# Patient Record
Sex: FEMALE | Race: WHITE
[De-identification: names, ages, dates, MRNs, and addresses within clinical notes are randomized per-mention and may not be internally consistent; named-entity substitution may affect disease eponyms.]

---

## 2020-01-09 ENCOUNTER — HOSPITAL ENCOUNTER (INPATIENT)
Dept: HOSPITAL 54 - TELE | Age: 80
LOS: 5 days | Discharge: HOME | DRG: 291 | End: 2020-01-14
Attending: INTERNAL MEDICINE | Admitting: NURSE PRACTITIONER
Payer: MEDICARE

## 2020-01-09 VITALS — WEIGHT: 156 LBS | BODY MASS INDEX: 30.63 KG/M2 | HEIGHT: 60 IN

## 2020-01-09 VITALS — DIASTOLIC BLOOD PRESSURE: 59 MMHG | SYSTOLIC BLOOD PRESSURE: 138 MMHG

## 2020-01-09 DIAGNOSIS — N17.0: ICD-10-CM

## 2020-01-09 DIAGNOSIS — Z88.2: ICD-10-CM

## 2020-01-09 DIAGNOSIS — E11.9: ICD-10-CM
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
## 2020-01-09 DIAGNOSIS — T86.12: ICD-10-CM

## 2020-01-09 DIAGNOSIS — Z88.0: ICD-10-CM

## 2020-01-09 DIAGNOSIS — I48.0: ICD-10-CM

## 2020-01-09 DIAGNOSIS — Z79.01: ICD-10-CM

## 2020-01-09 DIAGNOSIS — D50.9: ICD-10-CM

## 2020-01-09 DIAGNOSIS — I50.33: ICD-10-CM
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
## 2020-01-09 DIAGNOSIS — J15.9: ICD-10-CM

## 2020-01-09 DIAGNOSIS — I11.0: Primary | ICD-10-CM

## 2020-01-09 DIAGNOSIS — Z79.899: ICD-10-CM

## 2020-01-09 DIAGNOSIS — J45.901: ICD-10-CM

## 2020-01-09 DIAGNOSIS — E03.9: ICD-10-CM

## 2020-01-09 DIAGNOSIS — Z95.0: ICD-10-CM

## 2020-01-09 DIAGNOSIS — Z79.4: ICD-10-CM

## 2020-01-09 PROCEDURE — A4216 STERILE WATER/SALINE, 10 ML: HCPCS

## 2020-01-09 PROCEDURE — G0378 HOSPITAL OBSERVATION PER HR: HCPCS

## 2020-01-09 RX ADMIN — ALBUTEROL SULFATE SCH MG: 1.25 SOLUTION RESPIRATORY (INHALATION) at 22:42

## 2020-01-09 RX ADMIN — Medication SCH MG: at 22:42

## 2020-01-09 RX ADMIN — GUAIFENESIN SCH MG: 600 TABLET, EXTENDED RELEASE ORAL at 23:34

## 2020-01-09 NOTE — NUR
TELE/RN OPENING NOTES



PT DIRECT ADMIT FROM [facility]. PT IS A.OX3. PLACED ON 2L O2 VIA NC, PT WITH 
AUDIBLE WHEEZING, SOB ON EXERTION. WET COUGH NOTED. IV TO LEFT WRIST PATENT AND INTACT. 
BELONGINGS LIST COMPLETED. ORIENTED PT TO ROOM AND CALL LIGHT. PLACED ON CARDIAC MONITOR, 
SHOWING NSR, AV PACING WITH HR 60. BED IN LOW/LOCKED POSITION WITH CALL LIGHT IN REACH. SIDE 
RAILS UPX2 AND BED ALARM ON FOR SAFETY. HIMANSHU MORROW NOTIFIED OF PT'S ARRIVAL. WILL COME TO 
SEE THE PT

## 2020-01-10 VITALS — SYSTOLIC BLOOD PRESSURE: 117 MMHG | DIASTOLIC BLOOD PRESSURE: 53 MMHG

## 2020-01-10 VITALS — SYSTOLIC BLOOD PRESSURE: 140 MMHG | DIASTOLIC BLOOD PRESSURE: 66 MMHG

## 2020-01-10 VITALS — SYSTOLIC BLOOD PRESSURE: 122 MMHG | DIASTOLIC BLOOD PRESSURE: 52 MMHG

## 2020-01-10 VITALS — SYSTOLIC BLOOD PRESSURE: 115 MMHG | DIASTOLIC BLOOD PRESSURE: 60 MMHG

## 2020-01-10 VITALS — DIASTOLIC BLOOD PRESSURE: 63 MMHG | SYSTOLIC BLOOD PRESSURE: 132 MMHG

## 2020-01-10 LAB
BASOPHILS # BLD AUTO: 0 /CMM (ref 0–0.2)
BASOPHILS NFR BLD AUTO: 0 % (ref 0–2)
BUN SERPL-MCNC: 34 MG/DL (ref 7–18)
CALCIUM SERPL-MCNC: 8.7 MG/DL (ref 8.5–10.1)
CHLORIDE SERPL-SCNC: 103 MMOL/L (ref 98–107)
CHOLEST SERPL-MCNC: 91 MG/DL (ref ?–200)
CO2 SERPL-SCNC: 20 MMOL/L (ref 21–32)
CREAT SERPL-MCNC: 1.5 MG/DL (ref 0.6–1.3)
EOSINOPHIL NFR BLD AUTO: 0 % (ref 0–6)
GLUCOSE SERPL-MCNC: 193 MG/DL (ref 74–106)
HCT VFR BLD AUTO: 28 % (ref 33–45)
HDLC SERPL-MCNC: 19 MG/DL (ref 40–60)
HGB BLD-MCNC: 9 G/DL (ref 11.5–14.8)
LDLC SERPL DIRECT ASSAY-MCNC: 48 MG/DL (ref 0–99)
LYMPHOCYTES NFR BLD AUTO: 0.2 /CMM (ref 0.8–4.8)
LYMPHOCYTES NFR BLD AUTO: 5.6 % (ref 20–44)
MAGNESIUM SERPL-MCNC: 1.9 MG/DL (ref 1.8–2.4)
MCHC RBC AUTO-ENTMCNC: 32 G/DL (ref 31–36)
MCV RBC AUTO: 80 FL (ref 82–100)
MONOCYTES NFR BLD AUTO: 0.1 /CMM (ref 0.1–1.3)
MONOCYTES NFR BLD AUTO: 3.3 % (ref 2–12)
NEUTROPHILS # BLD AUTO: 3.2 /CMM (ref 1.8–8.9)
NEUTROPHILS NFR BLD AUTO: 91.1 % (ref 43–81)
PHOSPHATE SERPL-MCNC: 3.1 MG/DL (ref 2.5–4.9)
PLATELET # BLD AUTO: 259 /CMM (ref 150–450)
POTASSIUM SERPL-SCNC: 3.7 MMOL/L (ref 3.5–5.1)
RBC # BLD AUTO: 3.45 MIL/UL (ref 4–5.2)
SODIUM SERPL-SCNC: 137 MMOL/L (ref 136–145)
TRIGL SERPL-MCNC: 164 MG/DL (ref 30–150)
WBC NRBC COR # BLD AUTO: 3.6 K/UL (ref 4.3–11)

## 2020-01-10 RX ADMIN — Medication SCH EACH: at 00:37

## 2020-01-10 RX ADMIN — MYCOPHENOLATE MOFETIL SCH MG: 250 CAPSULE ORAL at 16:58

## 2020-01-10 RX ADMIN — INSULIN HUMAN PRN UNIT: 100 INJECTION, SOLUTION PARENTERAL at 17:19

## 2020-01-10 RX ADMIN — FLUTICASONE FUROATE AND VILANTEROL TRIFENATATE SCH EACH: 100; 25 POWDER RESPIRATORY (INHALATION) at 13:54

## 2020-01-10 RX ADMIN — Medication SCH MG: at 07:48

## 2020-01-10 RX ADMIN — Medication SCH MG: at 00:45

## 2020-01-10 RX ADMIN — Medication SCH EACH: at 17:34

## 2020-01-10 RX ADMIN — ALBUTEROL SULFATE SCH MG: 1.25 SOLUTION RESPIRATORY (INHALATION) at 07:48

## 2020-01-10 RX ADMIN — Medication SCH EACH: at 21:17

## 2020-01-10 RX ADMIN — OSELTAMIVIR PHOSPHATE SCH MG: 75 CAPSULE ORAL at 08:40

## 2020-01-10 RX ADMIN — ALBUTEROL SULFATE SCH MG: 1.25 SOLUTION RESPIRATORY (INHALATION) at 00:45

## 2020-01-10 RX ADMIN — Medication SCH MG: at 13:29

## 2020-01-10 RX ADMIN — GUAIFENESIN SCH MG: 600 TABLET, EXTENDED RELEASE ORAL at 08:40

## 2020-01-10 RX ADMIN — ALBUTEROL SULFATE SCH MG: 1.25 SOLUTION RESPIRATORY (INHALATION) at 13:29

## 2020-01-10 RX ADMIN — AMIODARONE HYDROCHLORIDE SCH MG: 200 TABLET ORAL at 14:08

## 2020-01-10 RX ADMIN — DOXYCYCLINE HYCLATE SCH MG: 100 TABLET, COATED ORAL at 21:08

## 2020-01-10 RX ADMIN — Medication SCH MG: at 16:58

## 2020-01-10 RX ADMIN — GUAIFENESIN SCH MG: 600 TABLET, EXTENDED RELEASE ORAL at 21:08

## 2020-01-10 RX ADMIN — Medication PRN EACH: at 21:29

## 2020-01-10 RX ADMIN — OSELTAMIVIR PHOSPHATE SCH MG: 75 CAPSULE ORAL at 16:58

## 2020-01-10 RX ADMIN — CEFEPIME HYDROCHLORIDE SCH MLS/HR: 1 INJECTION, POWDER, FOR SOLUTION INTRAMUSCULAR; INTRAVENOUS at 21:29

## 2020-01-10 RX ADMIN — ALBUTEROL SULFATE SCH MG: 1.25 SOLUTION RESPIRATORY (INHALATION) at 20:18

## 2020-01-10 RX ADMIN — Medication SCH MG: at 20:18

## 2020-01-10 RX ADMIN — CARVEDILOL SCH MG: 3.12 TABLET, FILM COATED ORAL at 21:08

## 2020-01-10 RX ADMIN — Medication SCH EACH: at 06:33

## 2020-01-10 RX ADMIN — INSULIN HUMAN PRN UNIT: 100 INJECTION, SOLUTION PARENTERAL at 00:38

## 2020-01-10 RX ADMIN — ATORVASTATIN CALCIUM SCH MG: 10 TABLET, FILM COATED ORAL at 21:08

## 2020-01-10 RX ADMIN — FLUTICASONE PROPIONATE SCH GM: 50 SPRAY, METERED NASAL at 21:28

## 2020-01-10 RX ADMIN — INSULIN HUMAN PRN UNIT: 100 INJECTION, SOLUTION PARENTERAL at 21:19

## 2020-01-10 RX ADMIN — INSULIN HUMAN PRN UNIT: 100 INJECTION, SOLUTION PARENTERAL at 06:33

## 2020-01-10 RX ADMIN — RIVAROXABAN SCH MG: 15 TABLET, FILM COATED ORAL at 16:59

## 2020-01-10 RX ADMIN — FLUTICASONE FUROATE AND VILANTEROL TRIFENATATE SCH EACH: 100; 25 POWDER RESPIRATORY (INHALATION) at 13:39

## 2020-01-10 RX ADMIN — Medication SCH EACH: at 12:00

## 2020-01-10 NOTE — NUR
MS RN OPENING NOTES

PATIENT AWAKE AND RESTING IN BED. A/O X4. ON 3L NC. NO S/S OF ACUTE RESPIRATORY DISTRESS AND 
NO COMPLAINTS OF PAIN AT THIS TIME. PATIENT REPORTS SOB DURING EXERTION. IV PRESENT ON LEFT 
WRIST, SIZE 22, INTACT & PATENT, HEP LOCKED. BED LOCKED, SEMI-CHENG'S POSITION, SIDE RAILS 
X2, CALL LIGHT WITHIN REACH. WILL CONTINUE TO MONITOR.

## 2020-01-10 NOTE — NUR
Met with patient, she is alert and pleasant. States she lives with her daughter in a single 
level home in [place]. States she is ambulatory and sometimes uses a walker if needed. 
Available DME: walker, shower chair and glucometer, no homehealth reported. She has hx of 
ESRD s/p kidney transplant in 2001, her son was the kidney donor. Pcp is  at [place]. Patient plan to return home, she will make arrangement for her family to provide 
transportation. 










-------------------------------------------------------------------------------

Addendum: 01/10/20 at 2151 by DAV SULLIVAN RN

-------------------------------------------------------------------------------

Amended: Links added.

## 2020-01-10 NOTE — NUR
TELE/RN OPENING NOTE

RECEIVED PATIENT AWAKE LYING IN BED COMFORTABLY ALERT AND ORIENTED X4. REMAINS ON 2LPM O2 
VIA NC, STILL WITH SOB ON EXERTION, AUDIBLE WHEEZING, IV TO LFA PATENT AND INTACT. HOB 
ELEVATED. BED IN LOW/LOCKED POSITION WITH CALL LIGHT IN REACH. SIDE RAILS UPX2. AV PACING AT 
92.WILL CONTINUE TO MONITOR.

## 2020-01-10 NOTE — NUR
MS/RN NOTES

SOLU MEDROL 40MG NOT GIVEN AT 1500 DUE TO FREQUENCY CHANGE TO BID. SOLU MEDROL 40MG WAS 
ADMINISTERED AT 1300.

## 2020-01-10 NOTE — NUR
TELE/RN CLOSING NOTES



PT AWAKE, SITTING AT THE EDGE OF BED. REMAINS ON 2LPM O2 VIA NC, STILL WITH SOB ON EXERTION, 
AUDIBLE WHEEZING, RECEIVING SCHEDULED BREATHING TX AS ORDERED. IV TO LFA PATENT AND INTACT. 
HOB ELEVATED. BED IN LOW/LOCKED POSITION WITH CALL LIGHT IN REACH. SIDE RAILS UPX2. REMAINS 
AV PACING WITH HR IN THE 60'S. NO OTHER CHANGES OVERNIGHT. ALL NEEDS MET AND ANTICIPATED. 
ENDORSED TO DAY SHIFT RN TIFFANIE.

## 2020-01-10 NOTE — NUR
TELE/RN NOTES



NOTIFIED HIMANSHU MORROW THAT PT'S BGL= 216, PT IS DIABETIC AND ON PREDNISONE. WITH ORDERS FOR 
MILD SLIDING SCALE ACHS. ORDERS READBACK FOR PROTOCOL. PER ODALYS, GIVE COVERAGE PER 
SLIDING SCALE NOW

## 2020-01-10 NOTE — NUR
MS RN NOTES

CALLED [facility] REGARDING PATIENT'S SPUTUM AND LAB RESULTS. PER NURSE 
SUPERVISOR, WILL CALL BACK TO FOLLOW UP WITH REQUEST.

## 2020-01-10 NOTE — NUR
TELE/RN NOTES



CLARIFIED TAMIFLU ORDER WITH HIMANSHU MORROW. WITH ORDERS TO GIVE ONE DOSE NOW AND RESTART THE 
SCHEDULE 1/10 BID 0900 AND 2100. ALSO WITH VERBAL ORDERS TO ADD ROBITUSSIN AC 5ML PRN FOR 
COUGH.

-------------------------------------------------------------------------------

Addendum: 01/10/20 at 0229 by AILIN INFANTE RN

-------------------------------------------------------------------------------

PER LINH MORROW TAMIFLU ONE TIME ORDER TONIGHT. IN HOUSE PHARMACY TO REVIEW IN THE MORNING.

## 2020-01-10 NOTE — NUR
307  MS RN CLOSING NOTES



PT IN BED AWAKE AND WATCHING TV AT THIS TIME. HOB ELEVATED.  A/O X4. ABLE TO MAKE NEEDS 
KNOWN. MAINTAINED ON SUPPLEMENTAL O2 VIA N/C AT 2LPM, TOLARATING WELL BUT STILL WITH SOB ON 
EXERTION. IV ACCESS ON LFA G#22 PATENT, INTACT AND FLUSHES WELL. BED IN LOW/LOCKED POSITION 
WITH CALL LIGHT IN REACH. SIDE RAILS UP X2.  ALL NEEDS AND CARE ATTENDED WELL. WILL ENDORSE 
TO NIGHT SHIFT NURSE FOR TIFFANIE

## 2020-01-11 VITALS — SYSTOLIC BLOOD PRESSURE: 128 MMHG | DIASTOLIC BLOOD PRESSURE: 58 MMHG

## 2020-01-11 VITALS — DIASTOLIC BLOOD PRESSURE: 54 MMHG | SYSTOLIC BLOOD PRESSURE: 115 MMHG

## 2020-01-11 VITALS — DIASTOLIC BLOOD PRESSURE: 48 MMHG | SYSTOLIC BLOOD PRESSURE: 122 MMHG

## 2020-01-11 LAB
ALBUMIN SERPL BCP-MCNC: 3.1 G/DL (ref 3.4–5)
ALP SERPL-CCNC: 80 U/L (ref 46–116)
ALT SERPL W P-5'-P-CCNC: 130 U/L (ref 12–78)
APPEARANCE UR: CLEAR
APPEARANCE UR: CLEAR
AST SERPL W P-5'-P-CCNC: 74 U/L (ref 15–37)
BASE EXCESS BLDA CALC-SCNC: -5.2 MMOL/L
BASOPHILS # BLD AUTO: 0 /CMM (ref 0–0.2)
BASOPHILS NFR BLD AUTO: 0.1 % (ref 0–2)
BILIRUB SERPL-MCNC: 0.7 MG/DL (ref 0.2–1)
BILIRUB UR QL STRIP: NEGATIVE
BILIRUB UR QL STRIP: NEGATIVE
BUN SERPL-MCNC: 36 MG/DL (ref 7–18)
CALCIUM SERPL-MCNC: 8.8 MG/DL (ref 8.5–10.1)
CHLORIDE SERPL-SCNC: 102 MMOL/L (ref 98–107)
CK SERPL-CCNC: 136 U/L (ref 26–192)
CO2 SERPL-SCNC: 21 MMOL/L (ref 21–32)
COLOR UR: YELLOW
COLOR UR: YELLOW
CREAT SERPL-MCNC: 1.3 MG/DL (ref 0.6–1.3)
CREAT UR-MCNC: 75.4 MG/DL (ref 30–125)
DO-HGB MFR BLDA: 115.3 MMHG
EOSINOPHIL NFR BLD AUTO: 0 % (ref 0–6)
GLUCOSE SERPL-MCNC: 265 MG/DL (ref 74–106)
GLUCOSE UR STRIP-MCNC: NEGATIVE MG/DL
GLUCOSE UR STRIP-MCNC: NEGATIVE MG/DL
HCT VFR BLD AUTO: 29 % (ref 33–45)
HGB BLD-MCNC: 9.6 G/DL (ref 11.5–14.8)
HGB UR QL STRIP: (no result) ERY/UL
HGB UR QL STRIP: NEGATIVE ERY/UL
INHALED O2 CONCENTRATION: 32 %
INHALED O2 FLOW RATE: 3 L/MIN (ref 0–30)
KETONES UR STRIP-MCNC: NEGATIVE MG/DL
KETONES UR STRIP-MCNC: NEGATIVE MG/DL
LEUKOCYTE ESTERASE UR QL STRIP: NEGATIVE
LEUKOCYTE ESTERASE UR QL STRIP: NEGATIVE
LYMPHOCYTES NFR BLD AUTO: 0.2 /CMM (ref 0.8–4.8)
LYMPHOCYTES NFR BLD AUTO: 3 % (ref 20–44)
MAGNESIUM SERPL-MCNC: 1.9 MG/DL (ref 1.8–2.4)
MCHC RBC AUTO-ENTMCNC: 33 G/DL (ref 31–36)
MCV RBC AUTO: 81 FL (ref 82–100)
MONOCYTES NFR BLD AUTO: 0.3 /CMM (ref 0.1–1.3)
MONOCYTES NFR BLD AUTO: 4.4 % (ref 2–12)
NEUTROPHILS # BLD AUTO: 6.6 /CMM (ref 1.8–8.9)
NEUTROPHILS NFR BLD AUTO: 92.5 % (ref 43–81)
NITRITE UR QL STRIP: NEGATIVE
NITRITE UR QL STRIP: NEGATIVE
PCO2 TEMP ADJ BLDA: 30.7 MMHG (ref 35–45)
PH TEMP ADJ BLDA: 7.4 [PH] (ref 7.35–7.45)
PH UR STRIP: 5.5 [PH] (ref 5–8)
PH UR STRIP: 6 [PH] (ref 5–8)
PHOSPHATE SERPL-MCNC: 2.9 MG/DL (ref 2.5–4.9)
PLATELET # BLD AUTO: 320 /CMM (ref 150–450)
PO2 TEMP ADJ BLDA: 76.9 MMHG (ref 75–100)
POTASSIUM SERPL-SCNC: 4.2 MMOL/L (ref 3.5–5.1)
PROT SERPL-MCNC: 5.9 G/DL (ref 6.4–8.2)
PROT UR QL STRIP: 30 MG/DL
PROT UR QL STRIP: 30 MG/DL
PROT UR-MCNC: 79.4 MG/DL (ref 0–11.9)
RBC # BLD AUTO: 3.59 MIL/UL (ref 4–5.2)
RBC #/AREA URNS HPF: (no result) /HPF (ref 0–2)
RBC #/AREA URNS HPF: (no result) /HPF (ref 0–2)
SAO2 % BLDA: 94.3 % (ref 92–98.5)
SODIUM SERPL-SCNC: 136 MMOL/L (ref 136–145)
SODIUM UR-SCNC: 10 MMOL/L (ref 40–220)
UROBILINOGEN UR STRIP-MCNC: 0.2 EU/DL
UROBILINOGEN UR STRIP-MCNC: 0.2 EU/DL
WBC #/AREA URNS HPF: (no result) /HPF (ref 0–3)
WBC #/AREA URNS HPF: (no result) /HPF (ref 0–3)
WBC NRBC COR # BLD AUTO: 7.1 K/UL (ref 4.3–11)

## 2020-01-11 RX ADMIN — HUMAN INSULIN SCH UNIT: 100 INJECTION, SUSPENSION SUBCUTANEOUS at 18:30

## 2020-01-11 RX ADMIN — ALBUTEROL SULFATE SCH MG: 1.25 SOLUTION RESPIRATORY (INHALATION) at 01:44

## 2020-01-11 RX ADMIN — Medication SCH MG: at 08:21

## 2020-01-11 RX ADMIN — GUAIFENESIN SCH MG: 600 TABLET, EXTENDED RELEASE ORAL at 10:09

## 2020-01-11 RX ADMIN — Medication SCH EACH: at 21:42

## 2020-01-11 RX ADMIN — Medication SCH MG: at 10:04

## 2020-01-11 RX ADMIN — CARVEDILOL SCH MG: 3.12 TABLET, FILM COATED ORAL at 10:04

## 2020-01-11 RX ADMIN — CEFEPIME HYDROCHLORIDE SCH MLS/HR: 1 INJECTION, POWDER, FOR SOLUTION INTRAMUSCULAR; INTRAVENOUS at 21:02

## 2020-01-11 RX ADMIN — Medication SCH MG: at 01:44

## 2020-01-11 RX ADMIN — Medication PRN EACH: at 10:23

## 2020-01-11 RX ADMIN — DOXYCYCLINE HYCLATE SCH MG: 100 TABLET, COATED ORAL at 21:03

## 2020-01-11 RX ADMIN — DOXYCYCLINE HYCLATE SCH MG: 100 TABLET, COATED ORAL at 10:04

## 2020-01-11 RX ADMIN — ACETAMINOPHEN PRN MG: 325 TABLET ORAL at 21:50

## 2020-01-11 RX ADMIN — MYCOPHENOLATE MOFETIL SCH MG: 250 CAPSULE ORAL at 18:32

## 2020-01-11 RX ADMIN — INSULIN HUMAN PRN UNIT: 100 INJECTION, SOLUTION PARENTERAL at 16:44

## 2020-01-11 RX ADMIN — MYCOPHENOLATE MOFETIL SCH MG: 250 CAPSULE ORAL at 10:05

## 2020-01-11 RX ADMIN — AMIODARONE HYDROCHLORIDE SCH MG: 200 TABLET ORAL at 18:32

## 2020-01-11 RX ADMIN — Medication SCH EACH: at 06:53

## 2020-01-11 RX ADMIN — Medication SCH MG: at 10:05

## 2020-01-11 RX ADMIN — LEVOTHYROXINE SODIUM SCH MCG: 100 TABLET ORAL at 10:09

## 2020-01-11 RX ADMIN — CARVEDILOL SCH MG: 3.12 TABLET, FILM COATED ORAL at 21:04

## 2020-01-11 RX ADMIN — Medication SCH EACH: at 12:27

## 2020-01-11 RX ADMIN — Medication SCH MG: at 12:58

## 2020-01-11 RX ADMIN — Medication SCH EACH: at 18:04

## 2020-01-11 RX ADMIN — ATORVASTATIN CALCIUM SCH MG: 10 TABLET, FILM COATED ORAL at 21:02

## 2020-01-11 RX ADMIN — INSULIN HUMAN PRN UNIT: 100 INJECTION, SOLUTION PARENTERAL at 21:46

## 2020-01-11 RX ADMIN — ALBUTEROL SULFATE SCH MG: 1.25 SOLUTION RESPIRATORY (INHALATION) at 19:25

## 2020-01-11 RX ADMIN — INSULIN HUMAN PRN UNIT: 100 INJECTION, SOLUTION PARENTERAL at 06:56

## 2020-01-11 RX ADMIN — FUROSEMIDE SCH MG: 40 TABLET ORAL at 10:04

## 2020-01-11 RX ADMIN — Medication SCH MG: at 19:26

## 2020-01-11 RX ADMIN — Medication SCH MG: at 18:31

## 2020-01-11 RX ADMIN — GUAIFENESIN SCH MG: 600 TABLET, EXTENDED RELEASE ORAL at 21:03

## 2020-01-11 RX ADMIN — OSELTAMIVIR PHOSPHATE SCH MG: 75 CAPSULE ORAL at 10:05

## 2020-01-11 RX ADMIN — RIVAROXABAN SCH MG: 15 TABLET, FILM COATED ORAL at 18:31

## 2020-01-11 RX ADMIN — FLUTICASONE FUROATE AND VILANTEROL TRIFENATATE SCH EACH: 100; 25 POWDER RESPIRATORY (INHALATION) at 10:05

## 2020-01-11 RX ADMIN — ALBUTEROL SULFATE SCH MG: 1.25 SOLUTION RESPIRATORY (INHALATION) at 08:21

## 2020-01-11 RX ADMIN — FLUTICASONE PROPIONATE SCH GM: 50 SPRAY, METERED NASAL at 10:06

## 2020-01-11 RX ADMIN — FLUTICASONE PROPIONATE SCH GM: 50 SPRAY, METERED NASAL at 21:02

## 2020-01-11 RX ADMIN — INSULIN HUMAN PRN UNIT: 100 INJECTION, SOLUTION PARENTERAL at 13:22

## 2020-01-11 RX ADMIN — ALBUTEROL SULFATE SCH MG: 1.25 SOLUTION RESPIRATORY (INHALATION) at 12:58

## 2020-01-11 RX ADMIN — HUMAN INSULIN SCH UNIT: 100 INJECTION, SUSPENSION SUBCUTANEOUS at 17:00

## 2020-01-11 NOTE — NUR
MS RN OPENING NOTES

PATIENT AWAKE IN BED UPON ARRIVAL. A/O X4. ON 3L NC. NO S/S OF ACUTE RESPIRATORY DISTRESS. 
PATIENT DENIES SOB OR PAIN AT THIS TIME. IV ON LEFT WRIST, SIZE 22, INTACT & PATENT, HEP 
LOCKED. BED LOCKED, SEMI-CHENG'S POSITION, SIDE RAILS X2, CALL LIGHT WITHIN REACH. WILL 
CONTINUE TO MONITOR.

## 2020-01-11 NOTE — NUR
MS RN NOTES

SPUTUM CULTURE AND LAB RESULTS FAXED FROM [facility] AND PLACED IN CHART.

## 2020-01-11 NOTE — NUR
family and pt. upset about med regimen.especilly insulin coverage.maciej texted and meds 
adjusted according to [facility]gbaic.

## 2020-01-11 NOTE — NUR
MS RN CLOSING NOTES

PATIENT AWAKE AND RESTING IN BED. A/O X4. ON 3L NC. NO S/S OF ACUTE RESPIRATORY DISTRESS AND 
NO COMPLAINTS OF PAIN AT THIS TIME. IV PRESENT ON LEFT WRIST, SIZE 22, INTACT & PATENT, HEP 
LOCKED. BED LOCKED, SEMI-CHENG'S POSITION, SIDE RAILS X2, CALL LIGHT WITHIN REACH. WILL 
ENDORSE TO DAY SHIFT NURSE TO FOLLOW PLAN OF CARE.

## 2020-01-12 VITALS — DIASTOLIC BLOOD PRESSURE: 45 MMHG | SYSTOLIC BLOOD PRESSURE: 111 MMHG

## 2020-01-12 VITALS — SYSTOLIC BLOOD PRESSURE: 114 MMHG | DIASTOLIC BLOOD PRESSURE: 57 MMHG

## 2020-01-12 VITALS — SYSTOLIC BLOOD PRESSURE: 123 MMHG | DIASTOLIC BLOOD PRESSURE: 63 MMHG

## 2020-01-12 VITALS — SYSTOLIC BLOOD PRESSURE: 134 MMHG | DIASTOLIC BLOOD PRESSURE: 64 MMHG

## 2020-01-12 LAB
BUN SERPL-MCNC: 41 MG/DL (ref 7–18)
CALCIUM SERPL-MCNC: 8.8 MG/DL (ref 8.5–10.1)
CHLORIDE SERPL-SCNC: 104 MMOL/L (ref 98–107)
CO2 SERPL-SCNC: 24 MMOL/L (ref 21–32)
CREAT SERPL-MCNC: 1.3 MG/DL (ref 0.6–1.3)
GLUCOSE SERPL-MCNC: 147 MG/DL (ref 74–106)
POTASSIUM SERPL-SCNC: 4.2 MMOL/L (ref 3.5–5.1)
SODIUM SERPL-SCNC: 138 MMOL/L (ref 136–145)

## 2020-01-12 RX ADMIN — Medication SCH EACH: at 12:09

## 2020-01-12 RX ADMIN — Medication SCH EACH: at 17:56

## 2020-01-12 RX ADMIN — INSULIN GLARGINE SCH UNIT: 100 INJECTION, SOLUTION SUBCUTANEOUS at 08:21

## 2020-01-12 RX ADMIN — FLUTICASONE PROPIONATE SCH GM: 50 SPRAY, METERED NASAL at 21:33

## 2020-01-12 RX ADMIN — CARVEDILOL SCH MG: 3.12 TABLET, FILM COATED ORAL at 08:14

## 2020-01-12 RX ADMIN — Medication SCH EACH: at 06:25

## 2020-01-12 RX ADMIN — CARVEDILOL SCH MG: 3.12 TABLET, FILM COATED ORAL at 21:34

## 2020-01-12 RX ADMIN — ALBUTEROL SULFATE SCH MG: 1.25 SOLUTION RESPIRATORY (INHALATION) at 14:19

## 2020-01-12 RX ADMIN — MYCOPHENOLATE MOFETIL SCH MG: 250 CAPSULE ORAL at 08:12

## 2020-01-12 RX ADMIN — ALBUTEROL SULFATE SCH MG: 1.25 SOLUTION RESPIRATORY (INHALATION) at 08:16

## 2020-01-12 RX ADMIN — INSULIN HUMAN PRN UNIT: 100 INJECTION, SOLUTION PARENTERAL at 06:46

## 2020-01-12 RX ADMIN — Medication SCH MG: at 08:16

## 2020-01-12 RX ADMIN — DOXYCYCLINE HYCLATE SCH MG: 100 TABLET, COATED ORAL at 21:33

## 2020-01-12 RX ADMIN — RIVAROXABAN SCH MG: 15 TABLET, FILM COATED ORAL at 16:46

## 2020-01-12 RX ADMIN — CEFEPIME HYDROCHLORIDE SCH MLS/HR: 1 INJECTION, POWDER, FOR SOLUTION INTRAMUSCULAR; INTRAVENOUS at 21:33

## 2020-01-12 RX ADMIN — INSULIN HUMAN PRN UNIT: 100 INJECTION, SOLUTION PARENTERAL at 21:58

## 2020-01-12 RX ADMIN — FUROSEMIDE SCH MG: 40 TABLET ORAL at 08:14

## 2020-01-12 RX ADMIN — Medication SCH MG: at 08:12

## 2020-01-12 RX ADMIN — Medication SCH MG: at 14:19

## 2020-01-12 RX ADMIN — Medication SCH MG: at 17:00

## 2020-01-12 RX ADMIN — GUAIFENESIN SCH MG: 600 TABLET, EXTENDED RELEASE ORAL at 10:44

## 2020-01-12 RX ADMIN — Medication SCH MG: at 19:55

## 2020-01-12 RX ADMIN — GUAIFENESIN SCH MG: 600 TABLET, EXTENDED RELEASE ORAL at 21:33

## 2020-01-12 RX ADMIN — FLUTICASONE PROPIONATE SCH GM: 50 SPRAY, METERED NASAL at 08:24

## 2020-01-12 RX ADMIN — INSULIN HUMAN PRN UNIT: 100 INJECTION, SOLUTION PARENTERAL at 16:34

## 2020-01-12 RX ADMIN — ATORVASTATIN CALCIUM SCH MG: 10 TABLET, FILM COATED ORAL at 21:34

## 2020-01-12 RX ADMIN — ACETAMINOPHEN PRN MG: 325 TABLET ORAL at 21:33

## 2020-01-12 RX ADMIN — MYCOPHENOLATE MOFETIL SCH MG: 250 CAPSULE ORAL at 16:46

## 2020-01-12 RX ADMIN — LEVOTHYROXINE SODIUM SCH MCG: 100 TABLET ORAL at 08:12

## 2020-01-12 RX ADMIN — HUMAN INSULIN SCH UNIT: 100 INJECTION, SUSPENSION SUBCUTANEOUS at 17:04

## 2020-01-12 RX ADMIN — ALBUTEROL SULFATE SCH MG: 1.25 SOLUTION RESPIRATORY (INHALATION) at 01:11

## 2020-01-12 RX ADMIN — AMIODARONE HYDROCHLORIDE SCH MG: 200 TABLET ORAL at 08:13

## 2020-01-12 RX ADMIN — ALBUTEROL SULFATE SCH MG: 1.25 SOLUTION RESPIRATORY (INHALATION) at 19:56

## 2020-01-12 RX ADMIN — OSELTAMIVIR PHOSPHATE SCH MG: 75 CAPSULE ORAL at 08:14

## 2020-01-12 RX ADMIN — Medication SCH MG: at 08:13

## 2020-01-12 RX ADMIN — HUMAN INSULIN SCH UNIT: 100 INJECTION, SUSPENSION SUBCUTANEOUS at 08:21

## 2020-01-12 RX ADMIN — DOXYCYCLINE HYCLATE SCH MG: 100 TABLET, COATED ORAL at 08:12

## 2020-01-12 RX ADMIN — Medication SCH MG: at 01:11

## 2020-01-12 RX ADMIN — Medication SCH EACH: at 21:53

## 2020-01-12 NOTE — NUR
MS/RN Closing note 



Patient is resting in bed, A/O x4, showing no signs of acute distress or SOB, saturating 
>90% on 3L NC. IV line is clean and intact s/l, showing no signs of acute distress or SOB. 
Patient has no new concerns or complaints of pain at this time. Patient kept clean and dry 
throughout shift, all patient needs met, all due meds given. 

Bed is in lowest position, side rails x2 in upright position, call light is within reach and 
patient is aware of how to call for assistance when needed. Safety precautions enforced. 
Will endorse to night shift.

## 2020-01-12 NOTE — NUR
MS RN CLOSING NOTES

PATIENT SLEEPING IN BED, EASY TO AWAKEN. A/OX4. ON 3L NC. NO COMPLAINTS OF SOB OR PAIN AT 
THIS TIME. IV PRESENT ON LEFT WRIST, SIZE 22, INTACT & PATENT, HEP LOCKED. BED LOCKED, 
LOW-CHENG'S POSITION, SIDE RAILS X2, CALL LIGHT WITHIN REACH. WILL ENDORSE TO DAY SHIFT 
NURSE TO FOLLOW PLAN OF CARE.

## 2020-01-12 NOTE — NUR
MS/RN Opening note 



Patient received resting in bed, A/O x4, showing no signs of acute distress or SOB, 
saturating >90% on 3L NC. IV line is clean and intact s/l, showing no signs of acute 
distress or SOB. Patient has no new concerns or complaints of pain at this time. Patient is 
able to ambulate to the bathroom independently. 

Bed is in lowest position, side rails x2 in upright position, call light is within reach and 
patient is aware of how to call for assistance when needed. Will continue with plan of care.

## 2020-01-12 NOTE — NUR
MS RN OPENING NOTES

PATIENT AWAKE AND RESTING IN BED UPON ARRIVAL. A/O X4. ON 3L NC. NO COMPLAINTS OF SOB OR 
PAIN AT THIS TIME. IV PRESENT ON LEFT WRIST, SIZE 22, INTACT & PATENT, HEP LOCKED. PATIENT 
ABLE TO VERBALIZE NEEDS. BED LOCKED, SEMI-CHENG'S POSITION, SIDE RAILS X2, CALL LIGHT 
WITHIN REACH. WILL CONTINUE TO MONITOR.

## 2020-01-13 VITALS — DIASTOLIC BLOOD PRESSURE: 53 MMHG | SYSTOLIC BLOOD PRESSURE: 123 MMHG

## 2020-01-13 VITALS — DIASTOLIC BLOOD PRESSURE: 68 MMHG | SYSTOLIC BLOOD PRESSURE: 132 MMHG

## 2020-01-13 VITALS — DIASTOLIC BLOOD PRESSURE: 65 MMHG | SYSTOLIC BLOOD PRESSURE: 140 MMHG

## 2020-01-13 LAB
BASOPHILS # BLD AUTO: 0 /CMM (ref 0–0.2)
BASOPHILS NFR BLD AUTO: 0.1 % (ref 0–2)
BUN SERPL-MCNC: 40 MG/DL (ref 7–18)
CALCIUM SERPL-MCNC: 8.4 MG/DL (ref 8.5–10.1)
CHLORIDE SERPL-SCNC: 104 MMOL/L (ref 98–107)
CO2 SERPL-SCNC: 27 MMOL/L (ref 21–32)
CREAT SERPL-MCNC: 1.2 MG/DL (ref 0.6–1.3)
EOSINOPHIL NFR BLD AUTO: 0.1 % (ref 0–6)
GLUCOSE SERPL-MCNC: 116 MG/DL (ref 74–106)
HCT VFR BLD AUTO: 32 % (ref 33–45)
HGB BLD-MCNC: 10.4 G/DL (ref 11.5–14.8)
LYMPHOCYTES NFR BLD AUTO: 0.8 /CMM (ref 0.8–4.8)
LYMPHOCYTES NFR BLD AUTO: 13.1 % (ref 20–44)
LYMPHOCYTES NFR BLD MANUAL: 22 % (ref 16–48)
MCHC RBC AUTO-ENTMCNC: 32 G/DL (ref 31–36)
MCV RBC AUTO: 81 FL (ref 82–100)
MONOCYTES NFR BLD AUTO: 0.6 /CMM (ref 0.1–1.3)
MONOCYTES NFR BLD AUTO: 10.9 % (ref 2–12)
MONOCYTES NFR BLD MANUAL: 5 % (ref 0–11)
NEUTROPHILS # BLD AUTO: 4.4 /CMM (ref 1.8–8.9)
NEUTROPHILS NFR BLD AUTO: 75.8 % (ref 43–81)
NEUTS SEG NFR BLD MANUAL: 73 % (ref 42–76)
PLATELET # BLD AUTO: 317 /CMM (ref 150–450)
POTASSIUM SERPL-SCNC: 3.3 MMOL/L (ref 3.5–5.1)
RBC # BLD AUTO: 3.99 MIL/UL (ref 4–5.2)
SODIUM SERPL-SCNC: 139 MMOL/L (ref 136–145)
WBC NRBC COR # BLD AUTO: 5.8 K/UL (ref 4.3–11)

## 2020-01-13 RX ADMIN — MYCOPHENOLATE MOFETIL SCH MG: 250 CAPSULE ORAL at 08:18

## 2020-01-13 RX ADMIN — FLUTICASONE PROPIONATE SCH SPRAY: 50 SPRAY, METERED NASAL at 21:05

## 2020-01-13 RX ADMIN — CEFEPIME HYDROCHLORIDE SCH MLS/HR: 1 INJECTION, POWDER, FOR SOLUTION INTRAMUSCULAR; INTRAVENOUS at 21:05

## 2020-01-13 RX ADMIN — FLUTICASONE FUROATE AND VILANTEROL TRIFENATATE SCH EACH: 100; 25 POWDER RESPIRATORY (INHALATION) at 08:28

## 2020-01-13 RX ADMIN — Medication SCH EACH: at 08:17

## 2020-01-13 RX ADMIN — INSULIN HUMAN PRN UNIT: 100 INJECTION, SOLUTION PARENTERAL at 21:21

## 2020-01-13 RX ADMIN — DOXYCYCLINE HYCLATE SCH MG: 100 TABLET, COATED ORAL at 21:05

## 2020-01-13 RX ADMIN — Medication SCH EACH: at 21:20

## 2020-01-13 RX ADMIN — Medication SCH MG: at 08:22

## 2020-01-13 RX ADMIN — LEVOTHYROXINE SODIUM SCH MCG: 100 TABLET ORAL at 08:22

## 2020-01-13 RX ADMIN — MYCOPHENOLATE MOFETIL SCH MG: 250 CAPSULE ORAL at 16:58

## 2020-01-13 RX ADMIN — HUMAN INSULIN SCH UNIT: 100 INJECTION, SUSPENSION SUBCUTANEOUS at 08:31

## 2020-01-13 RX ADMIN — HUMAN INSULIN SCH UNIT: 100 INJECTION, SUSPENSION SUBCUTANEOUS at 16:55

## 2020-01-13 RX ADMIN — ACETAMINOPHEN PRN MG: 325 TABLET ORAL at 16:58

## 2020-01-13 RX ADMIN — Medication SCH MG: at 16:58

## 2020-01-13 RX ADMIN — AMIODARONE HYDROCHLORIDE SCH MG: 200 TABLET ORAL at 08:22

## 2020-01-13 RX ADMIN — INSULIN GLARGINE SCH UNIT: 100 INJECTION, SOLUTION SUBCUTANEOUS at 08:31

## 2020-01-13 RX ADMIN — Medication SCH MG: at 19:57

## 2020-01-13 RX ADMIN — GUAIFENESIN SCH MG: 600 TABLET, EXTENDED RELEASE ORAL at 21:05

## 2020-01-13 RX ADMIN — INSULIN HUMAN PRN UNIT: 100 INJECTION, SOLUTION PARENTERAL at 16:57

## 2020-01-13 RX ADMIN — FUROSEMIDE SCH MG: 40 TABLET ORAL at 08:21

## 2020-01-13 RX ADMIN — Medication SCH MG: at 08:11

## 2020-01-13 RX ADMIN — INSULIN HUMAN PRN UNIT: 100 INJECTION, SOLUTION PARENTERAL at 12:32

## 2020-01-13 RX ADMIN — ALBUTEROL SULFATE SCH MG: 1.25 SOLUTION RESPIRATORY (INHALATION) at 19:57

## 2020-01-13 RX ADMIN — CARVEDILOL SCH MG: 3.12 TABLET, FILM COATED ORAL at 21:06

## 2020-01-13 RX ADMIN — Medication SCH EACH: at 12:32

## 2020-01-13 RX ADMIN — DOXYCYCLINE HYCLATE SCH MG: 100 TABLET, COATED ORAL at 08:22

## 2020-01-13 RX ADMIN — Medication SCH MG: at 13:20

## 2020-01-13 RX ADMIN — GUAIFENESIN SCH MG: 600 TABLET, EXTENDED RELEASE ORAL at 08:18

## 2020-01-13 RX ADMIN — Medication SCH MG: at 01:45

## 2020-01-13 RX ADMIN — ALBUTEROL SULFATE SCH MG: 1.25 SOLUTION RESPIRATORY (INHALATION) at 01:44

## 2020-01-13 RX ADMIN — FLUTICASONE PROPIONATE SCH SPRAY: 50 SPRAY, METERED NASAL at 08:29

## 2020-01-13 RX ADMIN — ALBUTEROL SULFATE SCH MG: 1.25 SOLUTION RESPIRATORY (INHALATION) at 08:11

## 2020-01-13 RX ADMIN — ATORVASTATIN CALCIUM SCH MG: 10 TABLET, FILM COATED ORAL at 21:06

## 2020-01-13 RX ADMIN — OSELTAMIVIR PHOSPHATE SCH MG: 75 CAPSULE ORAL at 08:18

## 2020-01-13 RX ADMIN — Medication SCH EACH: at 16:54

## 2020-01-13 RX ADMIN — CARVEDILOL SCH MG: 3.12 TABLET, FILM COATED ORAL at 08:21

## 2020-01-13 RX ADMIN — RIVAROXABAN SCH MG: 15 TABLET, FILM COATED ORAL at 16:59

## 2020-01-13 RX ADMIN — ALBUTEROL SULFATE SCH MG: 1.25 SOLUTION RESPIRATORY (INHALATION) at 13:20

## 2020-01-13 NOTE — NUR
MS RN NOTES



PATIENT RECEIVED RESTING INSIDE ROOM. AWAKE, A/O X 4. NO ACUTE DISTRESS .DENIES ANY PAIN OR 
DISCOMFORT. NO CHANGES IN LOC NOTED. WILL CONTINUE TO MONITOR. BED LOCKED AND IN LOW 
POSITION. BILATERAL UPPER SIDE RAILS UP AND LOCKED. CALL LIGHT WITHIN EASY REACH

## 2020-01-13 NOTE — NUR
MS RN NOTES



PATIENT RESTING INSIDE ROOM. AWAKE, A/O X 4. NO ACUTE DISTRESS. NO CHANGES IN LOC NOTED. 
PATIENT KEPT CLEAN, DRY AND COMFORTABLE. PROVIDED WITH CALM, SAFE, HAZARD-FREE ENVIRONMENT. 
WILL ENDORSE  TO INCOMING SHIFT FOR TIFFANIE. BED LOCKED AND IN LOW POSITION. BILATERAL UPPER 
SIDE RAILS UP AND LOCKED. CALL LIGHT WITHIN EASY REACH

## 2020-01-13 NOTE — NUR
MS/RN NOTES



RECEIVED PT. LYING IN BED RESTING. PT. IS EASILY AROUSABLE TO NAME AND TOUCH. PT. IS AWAKE, 
ALERT AND ORIENTED X3. BREATHING EVEN AND UNLABORED ON ROOM AIR. NO SOB, RESPIRATORY 
DISTRESS OR COMPLAINTS OF PAIN NOTED AT THIS TIME. PT. WITH LEFT WRIST 22 GAUGE IV SALINE 
LOCK PRESENT, PATENT AND INTACT. BED LOCKED AND IN LOWEST POSITION, SIDE RAILS UP X2, CALL 
LIGHT WITHIN REACH, WILL CONTINUE TO MONITOR.

## 2020-01-13 NOTE — NUR
MS RN CLOSING NOTES

PATIENT AWAKE AND RESTING IN BED. A/O X4. ON ROOM AIR. NO COMPLAINTS OF SOB OR PAIN AT THIS 
TIME. IV PRESENT ON LEFT WRIST, SIZE 22, HEP LOCKED. BED LOCKED, LOW-FOWLERS POSITION, SIDE 
RAILS X2, CALL LIGHT WITHIN REACH. WILL ENDORSE TO DAY SHIFT NURSE TO FOLLOW PLAN OF CARE.

## 2020-01-14 VITALS — SYSTOLIC BLOOD PRESSURE: 137 MMHG | DIASTOLIC BLOOD PRESSURE: 65 MMHG

## 2020-01-14 VITALS — DIASTOLIC BLOOD PRESSURE: 65 MMHG | SYSTOLIC BLOOD PRESSURE: 137 MMHG

## 2020-01-14 LAB
ALBUMIN SERPL ELPH-MCNC: 2.7 G/DL (ref 2.9–4.4)
ALBUMIN/GLOB SERPL: 0.9 {RATIO} (ref 0.7–1.7)
ALPHA1 GLOB SERPL ELPH-MCNC: 0.1 G/DL (ref 0–0.4)
ALPHA2 GLOB SERPL ELPH-MCNC: 0.9 G/DL (ref 0.4–1)
B-GLOBULIN SERPL ELPH-MCNC: 1 G/DL (ref 0.7–1.3)
BUN SERPL-MCNC: 39 MG/DL (ref 7–18)
CALCIUM SERPL-MCNC: 8.4 MG/DL (ref 8.5–10.1)
CHLORIDE SERPL-SCNC: 105 MMOL/L (ref 98–107)
CO2 SERPL-SCNC: 26 MMOL/L (ref 21–32)
CREAT SERPL-MCNC: 1.1 MG/DL (ref 0.6–1.3)
GAMMA GLOB SERPL ELPH-MCNC: 0.9 G/DL (ref 0.4–1.8)
GLOBULIN SER CALC-MCNC: 2.9 G/DL (ref 2.2–3.9)
GLUCOSE SERPL-MCNC: 96 MG/DL (ref 74–106)
POTASSIUM SERPL-SCNC: 3.4 MMOL/L (ref 3.5–5.1)
PTH-INTACT SERPL-MCNC: 41 PG/ML (ref 15–65)
SODIUM SERPL-SCNC: 140 MMOL/L (ref 136–145)

## 2020-01-14 RX ADMIN — Medication SCH MG: at 09:29

## 2020-01-14 RX ADMIN — MYCOPHENOLATE MOFETIL SCH MG: 250 CAPSULE ORAL at 09:28

## 2020-01-14 RX ADMIN — ALBUTEROL SULFATE SCH MG: 1.25 SOLUTION RESPIRATORY (INHALATION) at 01:13

## 2020-01-14 RX ADMIN — INSULIN GLARGINE SCH UNIT: 100 INJECTION, SOLUTION SUBCUTANEOUS at 09:47

## 2020-01-14 RX ADMIN — Medication SCH EACH: at 12:07

## 2020-01-14 RX ADMIN — Medication SCH MG: at 01:13

## 2020-01-14 RX ADMIN — ALBUTEROL SULFATE SCH MG: 1.25 SOLUTION RESPIRATORY (INHALATION) at 07:59

## 2020-01-14 RX ADMIN — Medication SCH EACH: at 06:54

## 2020-01-14 RX ADMIN — FLUTICASONE PROPIONATE SCH SPRAY: 50 SPRAY, METERED NASAL at 09:28

## 2020-01-14 RX ADMIN — Medication SCH MG: at 07:59

## 2020-01-14 RX ADMIN — FLUTICASONE FUROATE AND VILANTEROL TRIFENATATE SCH EACH: 100; 25 POWDER RESPIRATORY (INHALATION) at 09:27

## 2020-01-14 RX ADMIN — INSULIN HUMAN PRN UNIT: 100 INJECTION, SOLUTION PARENTERAL at 12:24

## 2020-01-14 RX ADMIN — AMIODARONE HYDROCHLORIDE SCH MG: 200 TABLET ORAL at 09:28

## 2020-01-14 RX ADMIN — GUAIFENESIN SCH MG: 600 TABLET, EXTENDED RELEASE ORAL at 09:29

## 2020-01-14 RX ADMIN — ALBUTEROL SULFATE SCH MG: 1.25 SOLUTION RESPIRATORY (INHALATION) at 13:48

## 2020-01-14 RX ADMIN — Medication SCH MG: at 09:28

## 2020-01-14 RX ADMIN — FUROSEMIDE SCH MG: 40 TABLET ORAL at 09:29

## 2020-01-14 RX ADMIN — Medication SCH MG: at 13:48

## 2020-01-14 RX ADMIN — CARVEDILOL SCH MG: 3.12 TABLET, FILM COATED ORAL at 09:29

## 2020-01-14 RX ADMIN — HUMAN INSULIN SCH UNIT: 100 INJECTION, SUSPENSION SUBCUTANEOUS at 09:48

## 2020-01-14 RX ADMIN — OSELTAMIVIR PHOSPHATE SCH MG: 75 CAPSULE ORAL at 09:30

## 2020-01-14 RX ADMIN — LEVOTHYROXINE SODIUM SCH MCG: 100 TABLET ORAL at 09:27

## 2020-01-14 RX ADMIN — DOXYCYCLINE HYCLATE SCH MG: 100 TABLET, COATED ORAL at 09:30

## 2020-01-14 NOTE — NUR
MS RN OPENING NOTES

Received Patient awake and resting in bed. A/O x 4. VS stable with no acute distress. 
Breathing even and unlabored on room air with no respiratory distress. Denies pain. No signs 
and symptoms of pain. 22g PIV on left wrist clean, intact, patent and flushing well. Safety 
precautions in place. Bed locked and set to lowest position with side rails x 2 up. All 
needs rendered at this time. Call light within reach. Will continue to monitor.

## 2020-01-14 NOTE — NUR
MS/RN NOTES



PT. IS LYING IN BED RESTING. BREATHING EVEN AND UNLABORED ON ROOM AIR. NO SOB, RESPIRATORY 
DISTRESS OR COMPLAINTS OF PAIN NOTED AT THIS TIME AND THROUGHOUT SHIFT. PT. WITH LEFT WRIST 
22 GAUGE IV SALINE LOCK PRESENT, PATENT AND INTACT. ALL PT. NEEDS MET. BED LOCKED AND IN 
LOWEST POSITION, SIDE RAILS UP X2, CALL LIGHT WITHIN REACH, WILL ENDORSE TO DAYSHIFT NURSE 
FOR CONTINUITY OF CARE.

## 2023-05-10 ENCOUNTER — OFFICE (OUTPATIENT)
Dept: URBAN - METROPOLITAN AREA CLINIC 90 | Facility: CLINIC | Age: 83
End: 2023-05-10

## 2023-05-10 DIAGNOSIS — D50.9 IRON DEFICIENCY ANEMIA: ICD-10-CM

## 2023-05-10 PROCEDURE — 99214 OFFICE O/P EST MOD 30 MIN: CPT | Performed by: INTERNAL MEDICINE

## 2023-05-10 NOTE — SERVICEHPINOTES
MEL DELEON   is seen for an initial visit today.     Referral mentions patient has weight loss, nausea, vomiting and iron deficiency. May labs with Hgb 10, MCV 79. In Nov. 2022, Hgb was 8. Hgb 9 in April but was 10 in March of 2023.  Initially was nauseated, not now as on medication. No longer losing weight. Had colonosopy 5 yrs. ago. Cardiologist, PMD and nephrologist follow patient but none in our system. History and insight into her prior history is limited. Unclear which anti-emetic she is taking currently.